# Patient Record
Sex: MALE | Race: WHITE | NOT HISPANIC OR LATINO | URBAN - METROPOLITAN AREA
[De-identification: names, ages, dates, MRNs, and addresses within clinical notes are randomized per-mention and may not be internally consistent; named-entity substitution may affect disease eponyms.]

---

## 2022-06-06 ENCOUNTER — OFFICE VISIT (OUTPATIENT)
Dept: URGENT CARE | Facility: CLINIC | Age: 22
End: 2022-06-06
Payer: COMMERCIAL

## 2022-06-06 VITALS
HEART RATE: 85 BPM | HEIGHT: 74 IN | BODY MASS INDEX: 25.67 KG/M2 | WEIGHT: 200 LBS | SYSTOLIC BLOOD PRESSURE: 122 MMHG | OXYGEN SATURATION: 97 % | TEMPERATURE: 98.7 F | DIASTOLIC BLOOD PRESSURE: 82 MMHG | RESPIRATION RATE: 18 BRPM

## 2022-06-06 DIAGNOSIS — M54.50 ACUTE BILATERAL LOW BACK PAIN WITHOUT SCIATICA: ICD-10-CM

## 2022-06-06 PROCEDURE — 99213 OFFICE O/P EST LOW 20 MIN: CPT | Performed by: PHYSICIAN ASSISTANT

## 2022-06-06 RX ORDER — CYCLOBENZAPRINE HCL 10 MG
10 TABLET ORAL 3 TIMES DAILY PRN
Qty: 30 TABLET | Refills: 0 | Status: SHIPPED | OUTPATIENT
Start: 2022-06-06

## 2022-06-06 RX ORDER — PREDNISONE 10 MG/1
40 TABLET ORAL 2 TIMES DAILY
Qty: 40 TABLET | Refills: 0 | Status: SHIPPED | OUTPATIENT
Start: 2022-06-06 | End: 2022-06-11

## 2022-06-06 ASSESSMENT — ENCOUNTER SYMPTOMS
NEUROLOGICAL NEGATIVE: 1
GASTROINTESTINAL NEGATIVE: 1
BACK PAIN: 1
CONSTITUTIONAL NEGATIVE: 1

## 2022-06-06 NOTE — PROGRESS NOTES
"  Subjective:     Jeremy Darnell  is a 22 y.o. male who presents for Low Back Pain (Hurt back while doing dead lifts yesterday, Bicep pain not a new injury, lower part of bicep, (R) side/)       He presents today for an acute flareup of low back pain that started yesterday.  This symptoms occurred when he was dead lifting.  Initially his symptoms began 1.5 weeks ago after doing clean weightlifting exercises; however, he did take time off from the gym and his back pain had been improving.  Denies loss of bowel or bladder function.  Does note intermittent episodes of low back pain occurring over the last 3 years.  Denies radicular symptoms into the lower extremities.  Pain is present bilaterally over the lumbosacral region.    He is also experiencing some intermittent episodes of pain over the right epicondyle.  These symptoms typically occur after performing heavy weightlifting activities with a pronated .  These symptoms have been intermittent and to resolve on their own with rest.  Denies loss of muscle strength or neurological changes in the right arm.     Review of Systems   Constitutional: Negative.    Gastrointestinal: Negative.    Genitourinary: Negative.    Musculoskeletal: Positive for back pain.   Neurological: Negative.       No Known Allergies  No past medical history on file.     Objective:   /82   Pulse 85   Temp 37.1 °C (98.7 °F) (Temporal)   Resp 18   Ht 1.88 m (6' 2\")   Wt 90.7 kg (200 lb)   SpO2 97%   BMI 25.68 kg/m²   Physical Exam  Vitals and nursing note reviewed.   Constitutional:       General: He is not in acute distress.     Appearance: He is not ill-appearing or toxic-appearing.   HENT:      Head: Normocephalic.      Nose: No rhinorrhea.   Eyes:      General: Scleral icterus present.      Conjunctiva/sclera: Conjunctivae normal.   Pulmonary:      Effort: Pulmonary effort is normal. No respiratory distress.      Breath sounds: No stridor.   Musculoskeletal:      " Cervical back: Neck supple.      Comments: Mild tenderness to palpation over bilateral SI joints and lumbosacral region.  Some tenderness over bilateral paraspinal musculature.  Lower extremity neurological exam normal, bilaterally.  No loss of muscle strength of the lower extremities.    Mild tenderness to palpation over the right epicondyle.  Wrist extension and  strength are normal of the right upper extremity.  Normal bicep strength.   Neurological:      Mental Status: He is alert and oriented to person, place, and time.   Psychiatric:         Mood and Affect: Mood normal.         Behavior: Behavior normal.         Thought Content: Thought content normal.         Judgment: Judgment normal.             Diagnostic testing: None    Assessment/Plan:     Encounter Diagnoses   Name Primary?   • Acute bilateral low back pain without sciatica         Plan for care for today's complaint includes Prednisone, Flexeril.  He should also take time off from the gym until his symptoms are fully resolved and then use a progressive return to activity.  He should continue to monitor for worsening low back pain, worsening radicular symptoms in the lower extremities, loss of bowel or bladder function; if these do occur then return to the urgent care or emergency department for reevaluation.  He can use ice and massage over his right epicondylitis..  Prescription for Prednisone, Flexeril provided.    See AVS Instructions below for written guidance provided to patient on after-visit management and care in addition to our verbal discussion during the visit.    Please note that this dictation was created using voice recognition software. I have attempted to correct all errors, but there may be sound-alike, spelling, grammar and possibly content errors that I did not discover before finalizing the note.    Diego Peoples PA-C

## 2024-01-17 ENCOUNTER — TELEPHONE (OUTPATIENT)
Dept: MEDICAL GROUP | Facility: LAB | Age: 24
End: 2024-01-17

## 2024-08-23 ENCOUNTER — OFFICE VISIT (OUTPATIENT)
Dept: URGENT CARE | Facility: PHYSICIAN GROUP | Age: 24
End: 2024-08-23
Payer: COMMERCIAL

## 2024-08-23 VITALS
RESPIRATION RATE: 16 BRPM | DIASTOLIC BLOOD PRESSURE: 76 MMHG | OXYGEN SATURATION: 97 % | TEMPERATURE: 98.1 F | HEIGHT: 74 IN | BODY MASS INDEX: 26 KG/M2 | HEART RATE: 76 BPM | SYSTOLIC BLOOD PRESSURE: 114 MMHG | WEIGHT: 202.6 LBS

## 2024-08-23 DIAGNOSIS — S39.012A ACUTE MYOFASCIAL STRAIN OF LUMBAR REGION, INITIAL ENCOUNTER: ICD-10-CM

## 2024-08-23 PROCEDURE — 3074F SYST BP LT 130 MM HG: CPT

## 2024-08-23 PROCEDURE — 3078F DIAST BP <80 MM HG: CPT

## 2024-08-23 PROCEDURE — 99213 OFFICE O/P EST LOW 20 MIN: CPT

## 2024-08-23 RX ORDER — CYCLOBENZAPRINE HCL 5 MG
5-10 TABLET ORAL
Qty: 15 TABLET | Refills: 0 | Status: SHIPPED | OUTPATIENT
Start: 2024-08-23

## 2024-08-23 ASSESSMENT — ENCOUNTER SYMPTOMS
NUMBNESS: 0
PARESTHESIAS: 0
HEADACHES: 0
NECK PAIN: 0
LOSS OF CONSCIOUSNESS: 0
PARESIS: 0
FALLS: 0
SENSORY CHANGE: 0
SEIZURES: 0
BACK PAIN: 1
MYALGIAS: 0
BOWEL INCONTINENCE: 0
ABDOMINAL PAIN: 0
TINGLING: 0
FEVER: 0
LEG PAIN: 0
TREMORS: 0
PERIANAL NUMBNESS: 0
FOCAL WEAKNESS: 0
DIZZINESS: 0
WEAKNESS: 0
SPEECH CHANGE: 0
WEIGHT LOSS: 0

## 2024-08-23 NOTE — PROGRESS NOTES
"Subjective:   Jeremy Darnell is a 24 y.o. male who presents for Low Back Pain (Lower back pain after squatting while working out, pt states that he felt pain horizontally like lightning across back X today)      Patient presents with complaints of low back pain after performing a heavy weightlifting workout.  Patient states that he was squatting with weight, immediately felt a \"jolt of pain horizontally across his back.\"  Patient states that he was able to stand back up and redirect his weights, immediately started stretching and use the sauna to try and alleviate symptoms.  States he started to feel his back tightening up which is why he presented to urgent care.  Denies any other trauma, injury mechanisms, and has no cauda equina symptoms such as loss of bowel or bladder, saddle numbness or tingling, or any radiculopathy of his pain    Back Pain  This is a new problem. The current episode started today. The problem occurs constantly. The problem has been rapidly worsening since onset. The pain is present in the lumbar spine and sacro-iliac. The quality of the pain is described as aching and cramping. The pain does not radiate. The pain is at a severity of 5/10. The pain is mild. The symptoms are aggravated by bending and twisting. Stiffness is present All day. Pertinent negatives include no abdominal pain, bladder incontinence, bowel incontinence, chest pain, dysuria, fever, headaches, leg pain, numbness, paresis, paresthesias, pelvic pain, perianal numbness, tingling, weakness or weight loss. Risk factors include poor posture. He has tried nothing for the symptoms.       Review of Systems   Constitutional:  Negative for fever and weight loss.   Cardiovascular:  Negative for chest pain.   Gastrointestinal:  Negative for abdominal pain and bowel incontinence.   Genitourinary:  Negative for bladder incontinence, dysuria and pelvic pain.   Musculoskeletal:  Positive for back pain. Negative for falls, joint " "pain, myalgias and neck pain.   Neurological:  Negative for dizziness, tingling, tremors, sensory change, speech change, focal weakness, seizures, loss of consciousness, weakness, numbness, headaches and paresthesias.       Medications, Allergies, and current problem list reviewed today in [employer].     Objective:     /76 (BP Location: Left arm, Patient Position: Sitting, BP Cuff Size: Adult)   Pulse 76   Temp 36.7 °C (98.1 °F) (Temporal)   Resp 16   Ht 1.88 m (6' 2\")   Wt 91.9 kg (202 lb 9.6 oz)   SpO2 97%     Physical Exam  Constitutional:       General: He is not in acute distress.     Appearance: Normal appearance. He is normal weight. He is not ill-appearing.   HENT:      Head: Normocephalic and atraumatic.   Musculoskeletal:      Cervical back: Normal.      Thoracic back: Normal.      Lumbar back: Spasms and tenderness present. No swelling, edema, deformity, signs of trauma, lacerations or bony tenderness. Decreased range of motion. Negative right straight leg raise test and negative left straight leg raise test. No scoliosis.        Back:       Comments: Decreased range of motion particularly with lumbar flexion and extension.  No bony step-off or malalignment appreciated, no trauma or injury mechanism noted.  Negative straight leg test bilaterally   Neurological:      General: No focal deficit present.      Mental Status: He is alert.      Cranial Nerves: No cranial nerve deficit.      Sensory: No sensory deficit.      Motor: No weakness.      Coordination: Coordination normal.      Gait: Gait normal.      Deep Tendon Reflexes: Reflexes normal.         Assessment/Plan:     Diagnosis and associated orders:     1. Acute myofascial strain of lumbar region, initial encounter  cyclobenzaprine (FLEXERIL) 5 mg tablet         Comments/MDM:     Patient's history of present illness and physical exam are consistent with an acute lumbar strain.  Although this injury is acute and happened recently, patient's " presentation is overall fairly benign and he shows no red flag warning signs such as cauda equina symptoms, loss of bowel or bladder, saddle numbness or tingling, or any radiculopathy.  Low suspicion for any disc herniation given patient is negative straight leg test both legs, and symptoms are alleviated with sitting.  Patient does have a history of muscle strains over the past few years, likely due to overuse with exercise and poor posture  Discussed extensively with patient using and incorporating Pilates with low weights, stretching, yoga, and mind muscle connective exercises to prevent future injuries.  Discussed active rest with no additional weight for the next 72 hours  Ice to the affected area 3 times daily for up to 30 minutes at a time  Tylenol/NSAIDs as staggered schedule for pain  Flexeril nightly for muscle spasms and to aid with back pain while sleeping  Discussed with patient if any red flag symptoms are noted please go to ER.  If symptoms are not improving over the next week, patient informed to reach out and we can reevaluate/discuss potential referral to sports medicine.         Differential diagnosis, natural history, supportive care, and indications for immediate follow-up discussed.    Advised the patient to follow-up with the primary care physician for recheck, reevaluation, and consideration of further management.    Please note that this dictation was created using voice recognition software. I have made a reasonable attempt to correct obvious errors, but I expect that there are errors of grammar and possibly content that I did not discover before finalizing the note.    This note was electronically signed by WILLOW Chavez

## 2024-08-23 NOTE — PATIENT INSTRUCTIONS
Low Back Sprain or Strain Rehab  Ask your health care provider which exercises are safe for you. Do exercises exactly as told by your health care provider and adjust them as directed. It is normal to feel mild stretching, pulling, tightness, or discomfort as you do these exercises. Stop right away if you feel sudden pain or your pain gets worse. Do not begin these exercises until told by your health care provider.  Stretching and range-of-motion exercises  These exercises warm up your muscles and joints and improve the movement and flexibility of your back. These exercises also help to relieve pain, numbness, and tingling.  Lumbar rotation    Lie on your back on a firm bed or the floor with your knees bent.  Straighten your arms out to your sides so each arm forms a 90-degree angle (right angle) with a side of your body.  Slowly move (rotate) both of your knees to one side of your body until you feel a stretch in your lower back (lumbar). Try not to let your shoulders lift off the floor.  Hold this position for __________ seconds.  Tense your abdominal muscles and slowly move your knees back to the starting position.  Repeat this exercise on the other side of your body.  Repeat __________ times. Complete this exercise __________ times a day.  Single knee to chest    Lie on your back on a firm bed or the floor with both legs straight.  Bend one of your knees. Use your hands to move your knee up toward your chest until you feel a gentle stretch in your lower back and buttock.  Hold your leg in this position by holding on to the front of your knee.  Keep your other leg as straight as possible.  Hold this position for __________ seconds.  Slowly return to the starting position.  Repeat with your other leg.  Repeat __________ times. Complete this exercise __________ times a day.  Prone extension on elbows    Lie on your abdomen on a firm bed or the floor (prone position).  Prop yourself up on your elbows.  Use your arms  to help lift your chest up until you feel a gentle stretch in your abdomen and your lower back.  This will place some of your body weight on your elbows. If this is uncomfortable, try stacking pillows under your chest.  Your hips should stay down, against the surface that you are lying on. Keep your hip and back muscles relaxed.  Hold this position for __________ seconds.  Slowly relax your upper body and return to the starting position.  Repeat __________ times. Complete this exercise __________ times a day.  Strengthening exercises  These exercises build strength and endurance in your back. Endurance is the ability to use your muscles for a long time, even after they get tired.  Pelvic tilt  This exercise strengthens the muscles that lie deep in the abdomen.  Lie on your back on a firm bed or the floor with your legs extended.  Bend your knees so they are pointing toward the ceiling and your feet are flat on the floor.  Tighten your lower abdominal muscles to press your lower back against the floor. This motion will tilt your pelvis so your tailbone points up toward the ceiling instead of pointing to your feet or the floor.  To help with this exercise, you may place a small towel under your lower back and try to push your back into the towel.  Hold this position for __________ seconds.  Let your muscles relax completely before you repeat this exercise.  Repeat __________ times. Complete this exercise __________ times a day.  Alternating arm and leg raises    Get on your hands and knees on a firm surface. If you are on a hard floor, you may want to use padding, such as an exercise mat, to cushion your knees.  Line up your arms and legs. Your hands should be directly below your shoulders, and your knees should be directly below your hips.  Lift your left leg behind you. At the same time, raise your right arm and straighten it in front of you.  Do not lift your leg higher than your hip.  Do not lift your arm higher  than your shoulder.  Keep your abdominal and back muscles tight.  Keep your hips facing the ground.  Do not arch your back.  Keep your balance carefully, and do not hold your breath.  Hold this position for __________ seconds.  Slowly return to the starting position.  Repeat with your right leg and your left arm.  Repeat __________ times. Complete this exercise __________ times a day.  Abdominal set with straight leg raise    Lie on your back on a firm bed or the floor.  Bend one of your knees and keep your other leg straight.  Tense your abdominal muscles and lift your straight leg up, 4-6 inches (10-15 cm) off the ground.  Keep your abdominal muscles tight and hold this position for __________ seconds.  Do not hold your breath.  Do not arch your back. Keep it flat against the ground.  Keep your abdominal muscles tense as you slowly lower your leg back to the starting position.  Repeat with your other leg.  Repeat __________ times. Complete this exercise __________ times a day.  Single leg lower with bent knees  Lie on your back on a firm bed or the floor.  Tense your abdominal muscles and lift your feet off the floor, one foot at a time, so your knees and hips are bent in 90-degree angles (right angles).  Your knees should be over your hips and your lower legs should be parallel to the floor.  Keeping your abdominal muscles tense and your knee bent, slowly lower one of your legs so your toe touches the ground.  Lift your leg back up to return to the starting position.  Do not hold your breath.  Do not let your back arch. Keep your back flat against the ground.  Repeat with your other leg.  Repeat __________ times. Complete this exercise __________ times a day.  Posture and body mechanics  Good posture and healthy body mechanics can help to relieve stress in your body's tissues and joints. Body mechanics refers to the movements and positions of your body while you do your daily activities. Posture is part of body  mechanics. Good posture means:  Your spine is in its natural S-curve position (neutral).  Your shoulders are pulled back slightly.  Your head is not tipped forward (neutral).  Follow these guidelines to improve your posture and body mechanics in your everyday activities.  Standing    When standing, keep your spine neutral and your feet about hip-width apart. Keep a slight bend in your knees. Your ears, shoulders, and hips should line up.  When you do a task in which you  one place for a long time, place one foot up on a stable object that is 2-4 inches (5-10 cm) high, such as a footstool. This helps keep your spine neutral.  Sitting    When sitting, keep your spine neutral and keep your feet flat on the floor. Use a footrest, if necessary, and keep your thighs parallel to the floor. Avoid rounding your shoulders, and avoid tilting your head forward.  When working at a desk or a computer, keep your desk at a height where your hands are slightly lower than your elbows. Slide your chair under your desk so you are close enough to maintain good posture.  When working at a computer, place your monitor at a height where you are looking straight ahead and you do not have to tilt your head forward or downward to look at the screen.  Resting  When lying down and resting, avoid positions that are most painful for you.  If you have pain with activities such as sitting, bending, stooping, or squatting, lie in a position in which your body does not bend very much. For example, avoid curling up on your side with your arms and knees near your chest (fetal position).  If you have pain with activities such as standing for a long time or reaching with your arms, lie with your spine in a neutral position and bend your knees slightly. Try the following positions:  Lying on your side with a pillow between your knees.  Lying on your back with a pillow under your knees.  Lifting    When lifting objects, keep your feet at least  shoulder-width apart and tighten your abdominal muscles.  Bend your knees and hips and keep your spine neutral. It is important to lift using the strength of your legs, not your back. Do not lock your knees straight out.  Always ask for help to lift heavy or awkward objects.  This information is not intended to replace advice given to you by your health care provider. Make sure you discuss any questions you have with your health care provider.  Document Revised: 03/07/2022 Document Reviewed: 03/07/2022  [employer] Patient Education © 2023 [employer]